# Patient Record
Sex: MALE | Race: AMERICAN INDIAN OR ALASKA NATIVE
[De-identification: names, ages, dates, MRNs, and addresses within clinical notes are randomized per-mention and may not be internally consistent; named-entity substitution may affect disease eponyms.]

---

## 2020-03-01 ENCOUNTER — HOSPITAL ENCOUNTER (EMERGENCY)
Dept: HOSPITAL 5 - ED | Age: 35
Discharge: HOME | End: 2020-03-01
Payer: MEDICAID

## 2020-03-01 VITALS — DIASTOLIC BLOOD PRESSURE: 84 MMHG | SYSTOLIC BLOOD PRESSURE: 137 MMHG

## 2020-03-01 DIAGNOSIS — T78.40XA: Primary | ICD-10-CM
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
## 2020-03-01 DIAGNOSIS — H02.844: ICD-10-CM

## 2020-03-01 DIAGNOSIS — H02.841: ICD-10-CM

## 2020-03-01 PROCEDURE — 99282 EMERGENCY DEPT VISIT SF MDM: CPT

## 2022-07-17 ENCOUNTER — HOSPITAL ENCOUNTER (EMERGENCY)
Dept: HOSPITAL 5 - ED | Age: 37
Discharge: HOME | End: 2022-07-17
Payer: MEDICAID

## 2022-07-17 VITALS — DIASTOLIC BLOOD PRESSURE: 75 MMHG | SYSTOLIC BLOOD PRESSURE: 115 MMHG

## 2022-07-17 DIAGNOSIS — F17.290: ICD-10-CM

## 2022-07-17 DIAGNOSIS — L23.7: Primary | ICD-10-CM

## 2022-07-17 PROCEDURE — 96372 THER/PROPH/DIAG INJ SC/IM: CPT

## 2022-07-17 PROCEDURE — 99282 EMERGENCY DEPT VISIT SF MDM: CPT

## 2022-07-17 NOTE — EMERGENCY DEPARTMENT REPORT
ED Rash HPI





- HPI


Chief Complaint: Allergic Reaction


Stated Complaint: ALLERGIC RX; RASH


Duration: 5 Days


Location: Upper Extremities, Other (face)


Rash Symptoms: Yes Itching, No Facial Swelling, No Tongue/Oral Swelling, No 

Breathing Difficulties, No Choking Sensation, No Wheezing/Dyspnea, No Peeling, 

No Blistering, No Fever, No Lightheaded, No Malaise, No Myalgias


Severity: mild


Other History: 37-year-old male presents to the ED complaining rash to the 

bilateral and face x5 days.  He states that he was outdoors in the yard when he 

noticed that he erupted into what looked like to be hives .  Patient stated that

over the last 5 days he has had intense pruritus.  Patient denies any difficulty

breathing fever chills or nausea or vomiting.  Patient is unsure if he came in 

contact with poison ivy.  Denies taking any over-the-counter medication.





ED Review of Systems


ROS: 


Stated complaint: ALLERGIC RX; RASH


Other details as noted in HPI





Constitutional: denies: chills, fever


Eyes: denies: eye pain, eye discharge, vision change


ENT: denies: ear pain, throat pain


Respiratory: denies: cough, shortness of breath, wheezing


Cardiovascular: denies: chest pain, palpitations


Endocrine: no symptoms reported


Gastrointestinal: denies: abdominal pain, nausea, diarrhea


Genitourinary: denies: urgency, dysuria


Musculoskeletal: denies: back pain, joint swelling, arthralgia


Skin: rash.  denies: lesions


Neurological: denies: headache, weakness, paresthesias


Psychiatric: denies: anxiety, depression


Hematological/Lymphatic: denies: easy bleeding, easy bruising





ED Past Medical Hx





- Past Medical History


Previous Medical History?: No





- Surgical History


Past Surgical History?: No





- Social History


Smoking Status: Current Every Day Smoker


Substance Use Type: None





- Medications


Home Medications: 


                                Home Medications











 Medication  Instructions  Recorded  Confirmed  Last Taken  Type


 


Ciprofloxacin HCl [Cipro] 500 mg PO Q12H #28 tab 06/09/15  Unknown Rx


 


Ibuprofen [Motrin 600 MG tab] 600 mg PO Q8H PRN #30 tablet 06/09/15  Unknown Rx


 


traMADoL [Ultram] 50 mg PO Q6HR PRN #12 tablet 06/09/15  Unknown Rx


 


Cetirizine HCl [Zyrtec 10mg tab] 10 mg PO QHS #10 tablet 03/01/20  Unknown Rx


 


Pramoxine HCl/Calamine [Calamine 1 applic TP DAILY #1 lotion 07/23/20  Unknown 

Rx





Medicated Lotion]     


 


Prednisone [predniSONE 10 mg 10 mg PO .TAPER #1 tab.ds.pk 07/23/20  Unknown Rx





(6-Day Pack, 21 Tabs)]     


 


cephALEXin [Keflex] 500 mg PO Q8HR #21 cap 07/23/20  Unknown Rx


 


hydrOXYzine HCL [Atarax] 25 mg PO QHS PRN #15 tablet 07/23/20  Unknown Rx


 


hydrOXYzine HCL [Atarax] 25 mg PO Q6HR PRN 15 Days #30 07/17/22  Unknown Rx





 tablet    


 


predniSONE [Deltasone] 50 mg PO QDAY 5 Days #5 tab 07/17/22  Unknown Rx














Rash Exam





- Exam


General: 


Vital signs noted. No distress. Alert and acting appropriately.





HEENT: No Periorbital Edema, No Conjuctival Injection, No Chemosis, No Perioral 

Edema, No Tongue Edema, No Uvular Edema, No Compromised Airway, No Drooling


Lungs: Yes Good Air Exchange (Normal Breath Sounds), No Wheezes, No Ronchi, No 

Stridor, No Cough, No Labored Respirations, No Retractions, No Use of Accessory 

Muscles, No Other Abnormal Lung Sounds


Heart: Yes Regular, No Murmur


Skin: Yes Urticarial Rash, No Maculopapular Rash, No Morbilliform rash, No 

Bulla(e), No Excoriations, No Weeping, No Tenderness, No Erythema, No Edema, No 

Encrustations, No Other


Other: Positive: Abdomen Normal, Neurologic Normal, Musculoskeletal Normal





ED Course


                                   Vital Signs











  07/17/22





  01:14


 


Temperature 98.5 F


 


Pulse Rate 83


 


Respiratory 16





Rate 


 


Blood Pressure 111/74


 


O2 Sat by Pulse 100





Oximetry 














ED Medical Decision Making





- Medical Decision Making


37-year-old male presents to the ED complaining rash to the bilateral and face 

x5 days.  He states that he was outdoors in the yard when he noticed that he 

erupted into what looked like to be hives .  Patient stated that over the last 5

 days he has had intense pruritus.  Patient denies any difficulty breathing 

fever chills or nausea or vomiting.  Patient is unsure if he came in contact 

with poison ivy.  Denies taking any over-the-counter medication.  Examination 

patient has multiple papular vesicle rash noted on bilateral arms and face.





Rechecked the patient is resting quietly quietly and comfortable and feeling 

better. I discussed the results of diagnostic study, my clinical impression and 

the plan for further treatment with the patient. Patient agrees with plan and 

discharge at this present time. All question addressed.





I have given the patient instruction regarding a diagnosis ,expectation ,follow-

up and return precaution. I explained to the patient that emergent condition may

 arise and to return to the ED for new worsen and any new persisting condition. 

I have explained the importance of following up with the primary care physician 

or referral physician listed below has instructed. The patient verbalized 

understanding of discharge instruction.








Critical care attestation.: 


If time is entered above; I have spent that time in minutes in the direct care 

of this critically ill patient, excluding procedure time.








ED Disposition


Clinical Impression: 


 Poison ivy





Disposition: 01 HOME / SELF CARE / HOMELESS


Is pt being admited?: No


Does the pt Need Aspirin: No


Condition: Stable


Instructions:  Poison Ivy Dermatitis, Easy-to-Read


Additional Instructions: 


Take medication as prescribed


Return to the ED for any worsening symptom


Prescriptions: 


hydrOXYzine HCL [Atarax] 25 mg PO Q6HR PRN 15 Days #30 tablet


 PRN Reason: Itching


predniSONE [Deltasone] 50 mg PO QDAY 5 Days #5 tab


Referrals: 


[facility] [Provider Group] - 3-5 Days


Forms:  Work/School Release Form(ED)


Time of Disposition: 11:42